# Patient Record
Sex: MALE | Race: WHITE | Employment: UNEMPLOYED | URBAN - METROPOLITAN AREA
[De-identification: names, ages, dates, MRNs, and addresses within clinical notes are randomized per-mention and may not be internally consistent; named-entity substitution may affect disease eponyms.]

---
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
## 2025-03-12 NOTE — PLAN OF CARE
"Problem: Depressive Symptoms  Goal: Depressive Symptoms  Interventions to focus on decreasing symptoms of depression,  decreasing self-injurious behaviors, elimination of suicidal ideation and elevation of mood. Additional interventions to focus on identifying and managing feelings, stress management, exercise, and healthy coping skills.     Therapeutic Goals:  1. Eriberto will develop and identify coping strategies. Stressors include: father's death this year, medical (cardiac) issues.  2. Eriberto will participate in milieu activities and psychiatric assessment; staff will encourage pt to find activities in which to engage so he may feel more empowered.   3. Eriberto will complete coping plan prior to d/c.  4. No signs or symptoms of med AEs will be observed or reported.  5. Eriberto will express willingness to participate in f/u care.  6. Eriberto will report a decrease in SI/depressive symptoms.      Outcome: Improving  48 hour nursing assessment:  Pt evaluation continues. Assessed mood, anxiety, thoughts, and behavior. Is progressing towards goals. Encourage participation in groups and developing healthy coping skills. Pt denies auditory or visual  hallucinations. Refer to daily team meeting notes for individualized plan of care. Will continue to assess.    Pt presents with full-range affect, though does appear flat at times. Pt denies feeling anxious or depressed, and denies having any thoughts or intent to harm self. Pt is visible in the milieu and actively social with peers. Pt stated to writer that he had felt suicidal in the past due to \"wanting to be with my dad\" and \"school is so stressful I just can't handle it\". Pt states he has ended his suicide attempts because \"I have hope that things will get better\". Pt also states \"I'm more hopeful now than I've ever been\". Pt was cooperative with completing admission interview. Pt denies any physical concerns and denies experiencing any dizziness, difficulty " Pt to [facility] via wheelchair. Urine sample collected and sent. Pt reporting 4/10 RLQ to mid abdominal pain. Reports taking a hydrocodone at 0430 for pain this morning before arrival to hospital. Denies N/V at this time. Family at bedside. Fall precautions in place. Call light within reach.    breathing, chest pain, numbness or tingling in extremities or pain. Pt's VSS. No other concerns at this time. Nursing will continue to monitor and assess.